# Patient Record
Sex: MALE | Race: WHITE | Employment: FULL TIME | URBAN - METROPOLITAN AREA
[De-identification: names, ages, dates, MRNs, and addresses within clinical notes are randomized per-mention and may not be internally consistent; named-entity substitution may affect disease eponyms.]

---

## 2020-04-20 ENCOUNTER — HOSPITAL ENCOUNTER (EMERGENCY)
Age: 40
Discharge: HOME | End: 2020-04-20
Attending: EMERGENCY MEDICINE
Payer: OTHER GOVERNMENT

## 2020-04-20 VITALS
TEMPERATURE: 98.6 F | HEIGHT: 72 IN | RESPIRATION RATE: 18 BRPM | DIASTOLIC BLOOD PRESSURE: 94 MMHG | WEIGHT: 225 LBS | BODY MASS INDEX: 30.48 KG/M2 | SYSTOLIC BLOOD PRESSURE: 146 MMHG | HEART RATE: 74 BPM | OXYGEN SATURATION: 97 %

## 2020-04-20 PROCEDURE — 99283 EMERGENCY DEPT VISIT LOW MDM: CPT

## 2020-04-20 ASSESSMENT — PAIN DESCRIPTION - LOCATION: LOCATION: BACK

## 2020-04-20 NOTE — ED NOTES
Pt Discharged in stable condition, VSS, no signs of distress, discharge instructions and meds reviewed. Pt verbalizes understanding and states no further questions or concerns unaddressed. Pt able to ambulate off unit by self.        Erica Bustamante RN  04/20/20 9755

## 2020-04-20 NOTE — ED PROVIDER NOTES
[address] [facility] Emergency Department      Pt Name: Usha Torres  MRN: [id number]  [place] 1980  Date of evaluation: 4/20/2020  Provider: Irma Kidd MD  CHIEF COMPLAINT  Chief Complaint   Patient presents with    Covid Testing     Pt to ED from home c/o COVID exposure at work. States coworker tested positive. HPI  Marco Salgado is a 46 y.o. male who presents because of concern for COVID exposure. 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 They are working together on Friday which was 3 days ago. He has a slight cough. He has slight discomfort in his throat as well. He believes it is associated with allergies. He has not had a fever. He denies any vomiting or diarrhea. He actually does not feel sick but want to be tested because of concern for infecting others if positive. REVIEW OF SYSTEMS:  No fever, nasal drainage, st, no abdominal pain, no sob See HPI for further details. Remainder of pertinent ROS reviewed and negative. Nursing notes reviewed. PAST MEDICAL HISTORY  No past medical history on file. SURGICAL HISTORY  No past surgical history on file. MEDICATIONS:  No current facility-administered medications on file prior to encounter. No current outpatient medications on file prior to encounter. ALLERGIES  Patient has no known allergies. SOCIAL HISTORY:  Social History     Tobacco Use    Smoking status: Not on file   Substance Use Topics    Alcohol use: Not on file    Drug use: Not on file     IMMUNIZATIONS:    There is no immunization history on file for this patient. PHYSICAL EXAM  VITAL SIGNS:  Blood pressure (!) 146/94, pulse 74, temperature 98.6 °F (37 °C), temperature source Oral, resp. rate 18, height 6' (1.829 m), weight 225 lb (102.1 kg), SpO2 97 %.   Constitutional:  46 y.o. male who does not appear toxic or acutely ill  HENT:  Atraumatic, mucous membranes moist, throat ok  Eyes:   Conjunctiva clear, no icterus  Neck: Supple, no signs of injury, good ROM  Thorax & Lungs:  Respiratory effort normal, clear  Abdomen:  Non distended  Back:  No deformity  Extremities:  No cyanosis, no edema  Skin:  Warm, dry  Neurologic:  Alert, no slurred speech    DIAGNOSTIC RESULTS:    Labs Reviewed   EMERGENT DISEASE PANEL     RADIOLOGY:  None     ED COURSE:  Uneventful     PROCEDURES:  None    CONSULTATIONS:  None    MEDICAL DECISION MAKING: Natalie Romero is a 46 y.o. male who presented because of concern for COVID 19 exposure. He is minimally symptomatic. Emergent disease panel ordered. He is oxygenating normally. He was advised on self quarantine which he is very familiar with due to his occupation. FOLLOW UP:    [facility]  [phone]  Schedule an appointment as soon as possible for a visit       [facility] [place] Emergency Department  [address]  Go to   If symptoms worsen    FINAL IMPRESSION:    1. Cough      (Please note that I used voice recognition software to generate this note.   Occasionally words are mistranscribed despite my efforts to edit errors.)     DISPOSITION Discharge - Pending Orders Complete 04/20/2020 10:25:33 AM       Nicole Nuñez MD  04/20/20 1123

## 2020-04-21 ENCOUNTER — CARE COORDINATION (OUTPATIENT)
Dept: CARE COORDINATION | Age: 40
End: 2020-04-21

## 2020-04-22 LAB
Lab: NORMAL
REPORT: NORMAL
SARS-COV-2: NOT DETECTED
THIS TEST SENT TO: NORMAL

## 2020-04-22 NOTE — CARE COORDINATION
Patient contacted regarding COVID-19 exposure. Care Transition Nurse/ Ambulatory Care Manager contacted the patient by telephone to perform post discharge assessment. Verified name and  with patient as identifiers. Provided introduction to self, and explanation of the CTN/ACM role, and reason for call due to risk factors for infection and/or exposure to COVID-19. Symptoms reviewed with patient who verbalized the following symptoms: cough. Due to no new or worsening symptoms encounter was not routed to provider for escalation. Patient has following risk factors of: no known risk factors. CTN/ACM reviewed discharge instructions, medical action plan and red flags such as increased shortness of breath, increasing fever and signs of decompensation with patient who verbalized understanding. Discussed exposure protocols and quarantine with CDC Guidelines What to do if you are sick with coronavirus disease .  Patient was given an opportunity for questions and concerns. The patient agrees to contact the [facility] exposure line [phone], local [facility] department [place] [facility]: ([phone]) and PCP office for questions related to their healthcare. CTN/ACM provided contact information for future needs. Reviewed and educated patient on any new and changed medications related to discharge diagnosis     Patient/family/caregiver given information for [employer] Loop and agrees to enroll no  Patient's preferred e-mail:    Patient's preferred phone number:   Based on Loop alert triggers, patient will be contacted by nurse care manager for worsening symptoms. Plan for follow-up call in 5-7 days based on severity of symptoms and risk factors. 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 He reports knowing the quide lines.

## 2020-04-27 ENCOUNTER — CARE COORDINATION (OUTPATIENT)
Dept: CARE COORDINATION | Age: 40
End: 2020-04-27

## 2022-01-03 ENCOUNTER — HOSPITAL ENCOUNTER (OUTPATIENT)
Age: 42
Discharge: HOME | End: 2022-01-03
Payer: COMMERCIAL

## 2022-01-03 LAB — SARS-COV-2, NAAT: NOT DETECTED

## 2022-01-03 PROCEDURE — 87635 SARS-COV-2 COVID-19 AMP PRB: CPT
